# Patient Record
Sex: FEMALE
[De-identification: names, ages, dates, MRNs, and addresses within clinical notes are randomized per-mention and may not be internally consistent; named-entity substitution may affect disease eponyms.]

---

## 2022-02-03 ENCOUNTER — NON-APPOINTMENT (OUTPATIENT)
Age: 57
End: 2022-02-03

## 2022-02-03 PROBLEM — Z00.00 ENCOUNTER FOR PREVENTIVE HEALTH EXAMINATION: Status: ACTIVE | Noted: 2022-02-03

## 2022-02-07 ENCOUNTER — APPOINTMENT (OUTPATIENT)
Dept: OBGYN | Facility: CLINIC | Age: 57
End: 2022-02-07
Payer: COMMERCIAL

## 2022-02-07 ENCOUNTER — NON-APPOINTMENT (OUTPATIENT)
Age: 57
End: 2022-02-07

## 2022-02-07 VITALS
SYSTOLIC BLOOD PRESSURE: 121 MMHG | DIASTOLIC BLOOD PRESSURE: 73 MMHG | WEIGHT: 165 LBS | HEART RATE: 68 BPM | TEMPERATURE: 97.7 F | HEIGHT: 69.5 IN | BODY MASS INDEX: 23.89 KG/M2

## 2022-02-07 DIAGNOSIS — R39.89 OTHER SYMPTOMS AND SIGNS INVOLVING THE GENITOURINARY SYSTEM: ICD-10-CM

## 2022-02-07 DIAGNOSIS — N76.0 ACUTE VAGINITIS: ICD-10-CM

## 2022-02-07 LAB
BILIRUB UR QL STRIP: NORMAL
GLUCOSE UR-MCNC: NORMAL
HCG UR QL: 0.2 EU/DL
HGB UR QL STRIP.AUTO: NORMAL
KETONES UR-MCNC: NORMAL
LEUKOCYTE ESTERASE UR QL STRIP: NORMAL
NITRITE UR QL STRIP: NORMAL
PH UR STRIP: 6.5
PROT UR STRIP-MCNC: NORMAL
SP GR UR STRIP: 1.02

## 2022-02-07 PROCEDURE — 81003 URINALYSIS AUTO W/O SCOPE: CPT | Mod: QW

## 2022-02-07 PROCEDURE — 99213 OFFICE O/P EST LOW 20 MIN: CPT

## 2022-02-07 RX ORDER — CIPROFLOXACIN HYDROCHLORIDE 500 MG/1
500 TABLET, FILM COATED ORAL
Qty: 14 | Refills: 1 | Status: ACTIVE | COMMUNITY
Start: 2022-02-07 | End: 1900-01-01

## 2022-02-07 RX ORDER — FLUCONAZOLE 200 MG/1
200 TABLET ORAL
Qty: 3 | Refills: 3 | Status: ACTIVE | COMMUNITY
Start: 2022-02-07 | End: 1900-01-01

## 2022-02-07 RX ORDER — CLOTRIMAZOLE AND BETAMETHASONE DIPROPIONATE 10; .5 MG/G; MG/G
1-0.05 CREAM TOPICAL TWICE DAILY
Qty: 1 | Refills: 3 | Status: ACTIVE | COMMUNITY
Start: 2022-02-07 | End: 1900-01-01

## 2022-02-07 NOTE — HISTORY OF PRESENT ILLNESS
[FreeTextEntry1] : HERE DUE TO SYMPTOMS OF UTI\par \par \par    	Print\par Patient\par Name	NAVNEET CARBALLO (56yo, F) ID# [id number]	Appt. Date/Time	2020 12:00PM\par 	1965	Service Dept.	[facility] BK OFFICE\par Provider	TORIE SANTILLAN MD\par Insurance	\par Med Primary: MEDICARE-[facility] (MEDICARE)\par Insurance # : [id number]\par Employer Name : [employer]\par Med Secondary: [employer]\par Insurance # : [id number]\par Employer Name : [employer]\par Prescription: [employer] - Member is eligible. details\par Chief Complaint\par annual gyn exam, c/o lower back pain\par \par HEMATURIA MICROSCOPIC\par Patient's Care Team\par Primary Care Provider: RONNA SEAY MD: [address] [place] [address], Ph [phone], Fax [phone] NPI: [id number]\par Patient's Pharmacies\par "[id number] ([place]): [address] [facility] [address], Ph [phone], Fax [phone]\par Vitals\par 2020 12:20 pm\par Ht:	5 ft 10 in\par Wt:	166 lbs\par BMI:	23.8\par BP:	122/66 sitting\par T:	97.1 F°\par Allergies\par Reviewed Allergies\par CORTISONE	\par LATEX	\par PENICILLINASE	\par PENICILLINS	\par SULFA (SULFONAMIDE ANTIBIOTICS)	\par Medications\par Reviewed Medications\par Aimovig Autoinjector 70 mg/mL subcutaneous auto-injector\par 18   filled	PRESCRIPTION SOLUTIONS\par Ajovy Syringe 225 mg/1.5 mL subcutaneous\par inject 1 SYRINGE ( 225 milligrams ) subcutaneously Every Month\par 19   filled	surescripts\par albuterol sulfate 2.5 mg/3 mL (0.083 %) solution for nebulization\par 17   filled	PRESCRIPTION SOLUTIONS\par Aptiom 200 mg tablet\par 17   filled	PRESCRIPTION SOLUTIONS\par atorvastatin 20 mg tablet\par 20   filled	surescripts\par benzonatate 100 mg capsule\par 16   filled	PRESCRIPTION SOLUTIONS\par betamethasone dipropionate 0.05 % topical ointment\par 19   filled	surescripts\par buPROPion HCL  mg 24 hr tablet, extended release\par 17   filled	PRESCRIPTION SOLUTIONS\par citalopram 20 mg tablet\par 17   filled	PRESCRIPTION SOLUTIONS\par clotrimazole-betamethasone 1 %-0.05 % topical cream\par APPLY TO THE AFFECTED AND SURROUNDING AREAS OF SKIN BY TOPICAL ROUTE 2 TIMES PER DAY IN THE MORNING AND EVENING FOR 2 WEEKS\par 18   prescribed	Torie Santillan MD\par Contour Control Solution, Normal\par 16   filled	PRESCRIPTION SOLUTIONS\par Contour Test Strips\par 19   filled	PRESCRIPTION SOLUTIONS\par dihydroergotamine 0.5 mg/pump act. (4 mg/mL) nasal spray\par 18   filled	PRESCRIPTION SOLUTIONS\par erythromycin-benzoyl peroxide 3 %-5 % topical gel\par 18   filled	PRESCRIPTION SOLUTIONS\par escitalopram 10 mg tablet\par 19   filled	PRESCRIPTION SOLUTIONS\par escitalopram 20 mg tablet\par take 1 tablet by mouth every evening\par 19   filled	surescripts\par esomeprazole magnesium 40 mg capsule,delayed release\par 18   filled	PRESCRIPTION SOLUTIONS\par fluticasone propionate 0.05 % topical cream\par 17   filled	PRESCRIPTION SOLUTIONS\par gabapentin 100 mg capsule\par 18   filled	PRESCRIPTION SOLUTIONS\par gabapentin 600 mg tablet\par 02/15/19   filled	PRESCRIPTION SOLUTIONS\par Latuda 20 mg tablet\par 18   filled	PRESCRIPTION SOLUTIONS\par lidocaine 5 % topical ointment\par 18   filled	PRESCRIPTION SOLUTIONS\par LORazepam 0.5 mg tablet\par 09/09/15   filled	PRESCRIPTION SOLUTIONS\par Lorzone 750 mg tablet\par TAKE 1 T PO QD HS\par 10/18/19   filled	surescripts\par methylPREDNISolone 4 mg tablets in a dose pack\par 16   filled	PRESCRIPTION SOLUTIONS\par metoclopramide 10 mg tablet\par 10/28/16   filled	PRESCRIPTION SOLUTIONS\par metroNIDAZOLE 500 mg tablet\par Take 1 tablet(s) every 12 hours by oral route for 7 days.\par 18   prescribed	Torie Santillan MD\par Microlet Lancet\par 19   filled	PRESCRIPTION SOLUTIONS\par mometasone 50 mcg/actuation nasal spray\par 17   filled	PRESCRIPTION SOLUTIONS\par montelukast 10 mg tablet\par 08/19/15   filled	PRESCRIPTION SOLUTIONS\par MorphaBond ER 15 mg tablet,oral ONLY (not feeding tubes)\par 18   filled	PRESCRIPTION SOLUTIONS\par mupirocin calcium 2 % topical cream\par 19   filled	PRESCRIPTION SOLUTIONS\par nitrofurantoin monohydrate/macrocrystals 100 mg capsule\par 16   filled	PRESCRIPTION SOLUTIONS\par nortriptyline 25 mg capsule\par 12/12/15   filled	PRESCRIPTION SOLUTIONS\par Nucynta ER 50 mg tablet,extended release\par 17   filled	PRESCRIPTION SOLUTIONS\par nystatin-triamcinolone 100,000 unit/g-0.1 % topical cream\par APPLY TO THE AFFECTED AREA(S) BY TOPICAL ROUTE 2 TIMES PER DAY IN THE MORNING AND EVENING\par 19   prescribed	Kacie Willard MD\par olopatadine 0.2 % eye drops\par 05/10/18   filled	PRESCRIPTION SOLUTIONS\par ondansetron 4 mg disintegrating tablet\par 06/10/19   filled	PRESCRIPTION SOLUTIONS\par Oxtellar  mg tablet,extended release\par 18   filled	PRESCRIPTION SOLUTIONS\par oxybutynin chloride ER 10 mg tablet,extended release 24 hr\par 20   filled	surescripts\par Pazeo 0.7 % eye drops\par 10/12/16   filled	PRESCRIPTION SOLUTIONS\par prednisoLONE sodium phosphate 20 mg/5 mL (4 mg/mL) oral solution\par 19   filled	PRESCRIPTION SOLUTIONS\par predniSONE 20 mg tablet\par 17   filled	PRESCRIPTION SOLUTIONS\par Prepopik 10 mg-3.5 gram-12 gram oral powder packet\par 17   filled	PRESCRIPTION SOLUTIONS\par ProAir HFA 90 mcg/actuation aerosol inhaler\par inhale 2 puffs by mouth and INTO THE LUNGS every 4 hours if needed\par 19   filled	surescripts\par raNITIdine 150 mg capsule\par 19   filled	PRESCRIPTION SOLUTIONS\par Rexulti 0.25 mg tablet\par 19   filled	PRESCRIPTION SOLUTIONS\par Rexulti 0.5 mg tablet\par TK 1 T PO Q EVENING\par 20   filled	surescripts\par rosuvastatin 20 mg tablet\par 16   filled	PRESCRIPTION SOLUTIONS\par sertraline 50 mg tablet\par 11/25/15   filled	PRESCRIPTION SOLUTIONS\par Silenor 6 mg tablet\par 18   filled	PRESCRIPTION SOLUTIONS\par SUMAtriptan 100 mg tablet\par TK 1 T PO QD UTD\par 19   filled	surescripts\par tiZANidine 4 mg tablet\par 16   filled	PRESCRIPTION SOLUTIONS\par tobramycin 0.3 % eye drops\par 19   filled	PRESCRIPTION SOLUTIONS\par traZODone 50 mg tablet\par 16   filled	PRESCRIPTION SOLUTIONS\par Trintellix 10 mg tablet\par 17   filled	PRESCRIPTION SOLUTIONS\par Trintellix 20 mg tablet\par 17   filled	PRESCRIPTION SOLUTIONS\par Trokendi  mg capsule, extended release\par 17   filled	PRESCRIPTION SOLUTIONS\par Trokendi  mg capsule, extended release\par TK 1 C PO Q NIGHT\par 19   filled	surescripts\par Trokendi XR 25 mg capsule,extended release\par 18   filled	PRESCRIPTION SOLUTIONS\par Zirgan 0.15 % eye gel\par 19   filled	PRESCRIPTION SOLUTIONS\par Zomig 5 mg nasal spray\par 10/29/15   filled	PRESCRIPTION SOLUTIONS\par Problems\par Reviewed Problems\par Amenorrhea\par Abnormal cervical Papanicolaou smear\par Gynecologic examination\par Urinary incontinence\par Pain in pelvis, Right\par Family History\par Reviewed Family History\par Mother	- Hypertensive disorder\par Father	- Pulmonary emphysema ( age: 81)\par  	- Diabetes mellitus\par Unspecified Relation	- Malignant tumor of ovary\par - MAT NIECE\par Maternal Aunt	- Malignant tumor of breast\par - x2\par Social History\par Reviewed Social History\par Tobacco Smoking Status: Never smoker\par Most Recent Tobacco Use Screenin2020\par DISABLED \par Surgical History\par Reviewed Surgical History\par Oophorectomy - RSO....TORSION AGE 21\par GYN History\par Reviewed GYN History\par LMP: Approximate.\par Date of LMP: (Notes: ).\par Obstetric History\par Reviewed Obstetric History\par TOTAL	FULL	PRE	AB. I	AB. S	ECTOPICS	MULTIPLE	LIVING\par 1	1						1\par 1 ....8 YO..JACQUI IVF\par Past Medical History\par Reviewed Past Medical History\par GI Problems: Y - REFLUX\par Headaches or Migraines: Y\par Documents for Discussion\par Discussed the following documents:\par URINALYSIS, DIPSTICK - 20\par Results:\par - Blood: Moderate   \par - Nitrite: negative   \par - Protein: Negative   \par - Bilirubin: Negative   \par - Glucose: Negative   \par - Color: Pale Yellow   \par Screening\par None recorded.\par HPI\par routine gyn exam\par ROS\par Patient reports no fatigue, no fever, no significant weight gain, and no significant weight loss. She reports no abnormal moles and no rashes. She reports no irritation and no vision changes. She reports no hearing loss, no ear pain, no nose/sinus problems, no sore throat, no snoring, no dry mouth, and no mouth ulcers. She reports no dyspnea / shortness of breath, no cough, no sputum production, no hemoptysis, and no wheezing. She reports no chest pain, no palpitations, and no orthopnea. She reports no heartburn, no dysphagia, no nausea, no vomiting, no abdominal pain, no bowel movement changes, no diarrhea, no constipation, and no rectal bleeding. She reports no hematuria, no abnormal bleeding, no flank pain, no trouble urinating, no incontinence, no rash, no lesion, no discharge, no vaginal odor, and no vaginal itching. She reports no menstrual problems and no PMDD symptoms. She reports no menopausal symptoms. She reports no sexual problems. She reports no muscle aches, no muscle weakness, no arthralgias/joint pain, and no back pain. She reports no headaches, no dizziness, no LOC, no weakness, no numbness, and no seizures. She reports no depression, no alcoholism, and no sleep disturbances.\par Physical Exam\par Patient is a 55-year-old female.\par \par Chaperone: Chaperone: present.\par \par Female Genitalia: Vulva: no masses, atrophy, or lesions. Bladder/Urethra: no urethral discharge or mass and normal meatus and bladder non distended. Vagina no tenderness, erythema, cystocele, rectocele, abnormal vaginal discharge, or vesicle(s) or ulcers. Cervix: no discharge or cervical motion tenderness and grossly normal. Uterus: normal size and shape and midline, mobile, non-tender, and no uterine prolapse. Adnexa/Parametria: no parametrial tenderness or mass and no adnexal tenderness or ovarian mass.\par \par Breast: Inspection/Palpation: no erythema, induration, tenderness, skin changes, abnormal secretions, or distinct masses and normal nipple appearance and non tender axillary lymph nodes.\par \par Abdomen: Auscultation/Inspection/Palpation: no hepatomegaly, splenomegaly, or masses and soft, non-distended, normal bowel sounds, and CVA tenderness (? RIGHT). Hernia: none palpated.\par Assessment / Plan\par 1. Gynecologic examination -\par MAMMO/SONO\par \par Z01.411: Encounter for gynecological examination (general) (routine) with abnormal findings\par PAP, LB\par \par 2. Pain in pelvis -\par NOT GYN, RIGHT\par \par KIDNEY STONES?\par \par ORDER RIGHT KIDNEY SONO\par \par R10.2: Pelvic and perineal pain\par US, TRANSVAGINAL\par \par 3. Microscopic hematuria -\par FISH\par \par FU UROLOGY\par \par R31.21: Asymptomatic microscopic hematuria\par URINALYSIS, DIPSTICK\par \par URINALYSIS, DIPSTICK\par Results:\par - Blood: Moderate\par - Nitrite: negative\par - Protein: Negative\par - Glucose: Negative\par - Color: Pale Yellow\par - Bilirubin: Negative\par \par US, TRANSVAGINAL\par \par Review of us, transvaginal taken on 2020 at [facility] BK OFFICE shows:\par Imaging Studies:\par Indications: pelvic pain.\par Uterus: volume (cc): 23.\par Endometrium: thickness 2.8 mm.\par Cervix: normal.\par Cul de sac: no fluid was demonstrated.\par Right Ovary: not-visualized, size (cm): ___.\par Left Ovary: volume (cc): 0.5.\par \par Return to Office\par None recorded.\par Encounter Sign-Off\par Encounter signed-off by Torie Santillan MD, 2020.\par Encounter performed and documented by Torie Santillan MD\par Encounter reviewed & signed by Torie Santillan MD on 2020 at 12:47pm\par Audit history\par \par \par Image Documentation #[id number]\par

## 2022-02-09 ENCOUNTER — NON-APPOINTMENT (OUTPATIENT)
Age: 57
End: 2022-02-09

## 2022-02-13 LAB
A VAGINAE DNA VAG QL NAA+PROBE: NORMAL
BACTERIA UR CULT: NORMAL
BVAB2 DNA VAG QL NAA+PROBE: NORMAL
C KRUSEI DNA VAG QL NAA+PROBE: NEGATIVE
C TRACH RRNA SPEC QL NAA+PROBE: NEGATIVE
MEGA1 DNA VAG QL NAA+PROBE: NORMAL
N GONORRHOEA RRNA SPEC QL NAA+PROBE: NEGATIVE
T VAGINALIS RRNA SPEC QL NAA+PROBE: NEGATIVE

## 2022-02-14 ENCOUNTER — NON-APPOINTMENT (OUTPATIENT)
Age: 57
End: 2022-02-14

## 2022-03-14 ENCOUNTER — APPOINTMENT (OUTPATIENT)
Dept: OBGYN | Facility: CLINIC | Age: 57
End: 2022-03-14
Payer: COMMERCIAL

## 2022-03-14 VITALS
HEIGHT: 69.5 IN | BODY MASS INDEX: 24.04 KG/M2 | DIASTOLIC BLOOD PRESSURE: 72 MMHG | TEMPERATURE: 97.5 F | HEART RATE: 64 BPM | SYSTOLIC BLOOD PRESSURE: 124 MMHG | WEIGHT: 166 LBS

## 2022-03-14 DIAGNOSIS — Z01.419 ENCOUNTER FOR GYNECOLOGICAL EXAMINATION (GENERAL) (ROUTINE) W/OUT ABNORMAL FINDINGS: ICD-10-CM

## 2022-03-14 PROCEDURE — 99396 PREV VISIT EST AGE 40-64: CPT

## 2022-03-14 NOTE — HISTORY OF PRESENT ILLNESS
[FreeTextEntry1] : here for pap and annual\par still with rlq pain\par did not have uti\par seeing urogyn after ct scan tomorrow

## 2022-03-17 LAB — HPV HIGH+LOW RISK DNA PNL CVX: NOT DETECTED

## 2022-03-27 LAB — CYTOLOGY CVX/VAG DOC THIN PREP: NORMAL

## 2022-05-09 ENCOUNTER — NON-APPOINTMENT (OUTPATIENT)
Age: 57
End: 2022-05-09

## 2022-05-16 ENCOUNTER — NON-APPOINTMENT (OUTPATIENT)
Age: 57
End: 2022-05-16

## 2022-05-19 ENCOUNTER — NON-APPOINTMENT (OUTPATIENT)
Age: 57
End: 2022-05-19

## 2022-05-19 DIAGNOSIS — N60.09 SOLITARY CYST OF UNSPECIFIED BREAST: ICD-10-CM

## 2022-05-23 ENCOUNTER — NON-APPOINTMENT (OUTPATIENT)
Age: 57
End: 2022-05-23

## 2022-07-13 ENCOUNTER — NON-APPOINTMENT (OUTPATIENT)
Age: 57
End: 2022-07-13

## 2022-08-01 ENCOUNTER — NON-APPOINTMENT (OUTPATIENT)
Age: 57
End: 2022-08-01

## 2022-08-01 ENCOUNTER — APPOINTMENT (OUTPATIENT)
Dept: OBGYN | Facility: CLINIC | Age: 57
End: 2022-08-01

## 2022-08-01 VITALS
WEIGHT: 167 LBS | DIASTOLIC BLOOD PRESSURE: 71 MMHG | BODY MASS INDEX: 24.18 KG/M2 | HEART RATE: 71 BPM | HEIGHT: 69.5 IN | SYSTOLIC BLOOD PRESSURE: 118 MMHG

## 2022-08-01 DIAGNOSIS — N89.8 OTHER SPECIFIED NONINFLAMMATORY DISORDERS OF VAGINA: ICD-10-CM

## 2022-08-01 LAB
BILIRUB UR QL STRIP: NEGATIVE
CLARITY UR: CLEAR
COLLECTION METHOD: NORMAL
GLUCOSE UR-MCNC: NEGATIVE
HCG UR QL: 0.2 EU/DL
HGB UR QL STRIP.AUTO: NORMAL
KETONES UR-MCNC: NEGATIVE
LEUKOCYTE ESTERASE UR QL STRIP: NEGATIVE
NITRITE UR QL STRIP: NEGATIVE
PH UR STRIP: 5
PROT UR STRIP-MCNC: NEGATIVE
SP GR UR STRIP: 1.03

## 2022-08-01 PROCEDURE — 99213 OFFICE O/P EST LOW 20 MIN: CPT

## 2022-08-01 PROCEDURE — 81003 URINALYSIS AUTO W/O SCOPE: CPT | Mod: QW

## 2022-08-01 NOTE — HISTORY OF PRESENT ILLNESS
[FreeTextEntry1] : -pt complains of persistent right lower quadrant pain.\par -she states that GI and  consults were negative\par -recent pelvic and renal us were negative\par \par -she seeks my advice today regarding this pain and vaginal discharge

## 2022-08-02 ENCOUNTER — NON-APPOINTMENT (OUTPATIENT)
Age: 57
End: 2022-08-02

## 2022-09-06 ENCOUNTER — NON-APPOINTMENT (OUTPATIENT)
Age: 57
End: 2022-09-06

## 2022-09-07 ENCOUNTER — NON-APPOINTMENT (OUTPATIENT)
Age: 57
End: 2022-09-07

## 2023-06-01 ENCOUNTER — NON-APPOINTMENT (OUTPATIENT)
Age: 58
End: 2023-06-01

## 2023-10-05 ENCOUNTER — NON-APPOINTMENT (OUTPATIENT)
Age: 58
End: 2023-10-05

## 2023-10-05 DIAGNOSIS — B96.89 ACUTE VAGINITIS: ICD-10-CM

## 2023-10-05 DIAGNOSIS — N76.0 ACUTE VAGINITIS: ICD-10-CM

## 2023-10-07 RX ORDER — FLUCONAZOLE 200 MG/1
200 TABLET ORAL
Qty: 2 | Refills: 0 | Status: ACTIVE | COMMUNITY
Start: 2023-10-05 | End: 1900-01-01

## 2023-10-07 RX ORDER — METRONIDAZOLE 500 MG/1
500 TABLET ORAL TWICE DAILY
Qty: 14 | Refills: 0 | Status: ACTIVE | COMMUNITY
Start: 2023-10-05 | End: 1900-01-01

## 2024-03-25 ENCOUNTER — APPOINTMENT (OUTPATIENT)
Dept: OBGYN | Facility: CLINIC | Age: 59
End: 2024-03-25
Payer: MEDICARE

## 2024-03-25 VITALS
HEART RATE: 61 BPM | SYSTOLIC BLOOD PRESSURE: 132 MMHG | WEIGHT: 162 LBS | DIASTOLIC BLOOD PRESSURE: 81 MMHG | BODY MASS INDEX: 23.45 KG/M2 | HEIGHT: 69.5 IN

## 2024-03-25 DIAGNOSIS — R31.29 OTHER MICROSCOPIC HEMATURIA: ICD-10-CM

## 2024-03-25 DIAGNOSIS — Z78.9 OTHER SPECIFIED HEALTH STATUS: ICD-10-CM

## 2024-03-25 DIAGNOSIS — N93.9 ABNORMAL UTERINE AND VAGINAL BLEEDING, UNSPECIFIED: ICD-10-CM

## 2024-03-25 DIAGNOSIS — R10.2 PELVIC AND PERINEAL PAIN: ICD-10-CM

## 2024-03-25 LAB
BILIRUB UR QL STRIP: NORMAL
CLARITY UR: CLEAR
COLLECTION METHOD: NORMAL
GLUCOSE UR-MCNC: NORMAL
HCG UR QL: 0.2 EU/DL
HGB UR QL STRIP.AUTO: NORMAL
KETONES UR-MCNC: NORMAL
LEUKOCYTE ESTERASE UR QL STRIP: NORMAL
NITRITE UR QL STRIP: NORMAL
PH UR STRIP: 6
PROT UR STRIP-MCNC: NORMAL
SP GR UR STRIP: 1.02

## 2024-03-25 PROCEDURE — 99213 OFFICE O/P EST LOW 20 MIN: CPT | Mod: 25

## 2024-03-25 PROCEDURE — 76830 TRANSVAGINAL US NON-OB: CPT

## 2024-03-25 PROCEDURE — 81003 URINALYSIS AUTO W/O SCOPE: CPT | Mod: QW

## 2024-03-25 NOTE — PHYSICAL EXAM
[Chaperone Present] : A chaperone was present in the examining room during all aspects of the physical examination [Vulvar Atrophy] : vulvar atrophy [Labia Majora] : normal [Labia Minora] : normal [Normal] : normal [Uterine Adnexae] : normal [FreeTextEntry1] : NO BLOOD [FreeTextEntry4] : NO BLOOD

## 2024-03-25 NOTE — PROCEDURE
[Transvaginal Ultrasound] : transvaginal ultrasound [FreeTextEntry3] : RIGHT OV NORMAL CERVIX NORMAL NO FREE FLUID [FreeTextEntry5] : 27 CC VOL,1.3 MM [FreeTextEntry7] : 0.9 CC [FreeTextEntry8] : ABSENT

## 2024-03-30 LAB — HPV HIGH+LOW RISK DNA PNL CVX: NOT DETECTED

## 2024-04-03 LAB — CYTOLOGY CVX/VAG DOC THIN PREP: ABNORMAL

## 2024-04-15 ENCOUNTER — APPOINTMENT (OUTPATIENT)
Dept: OBGYN | Facility: CLINIC | Age: 59
End: 2024-04-15

## 2024-09-30 ENCOUNTER — APPOINTMENT (OUTPATIENT)
Dept: OBGYN | Facility: CLINIC | Age: 59
End: 2024-09-30

## 2024-10-07 ENCOUNTER — APPOINTMENT (OUTPATIENT)
Dept: OBGYN | Facility: CLINIC | Age: 59
End: 2024-10-07
Payer: MEDICARE

## 2024-10-07 ENCOUNTER — NON-APPOINTMENT (OUTPATIENT)
Age: 59
End: 2024-10-07

## 2024-10-07 VITALS
WEIGHT: 158 LBS | HEIGHT: 69.5 IN | DIASTOLIC BLOOD PRESSURE: 75 MMHG | SYSTOLIC BLOOD PRESSURE: 125 MMHG | HEART RATE: 69 BPM | BODY MASS INDEX: 22.88 KG/M2

## 2024-10-07 DIAGNOSIS — Z80.41 FAMILY HISTORY OF MALIGNANT NEOPLASM OF OVARY: ICD-10-CM

## 2024-10-07 DIAGNOSIS — K42.9 UMBILICAL HERNIA W/OUT OBSTRUCTION OR GANGRENE: ICD-10-CM

## 2024-10-07 DIAGNOSIS — N93.9 ABNORMAL UTERINE AND VAGINAL BLEEDING, UNSPECIFIED: ICD-10-CM

## 2024-10-07 DIAGNOSIS — N94.10 UNSPECIFIED DYSPAREUNIA: ICD-10-CM

## 2024-10-07 DIAGNOSIS — Z01.419 ENCOUNTER FOR GYNECOLOGICAL EXAMINATION (GENERAL) (ROUTINE) W/OUT ABNORMAL FINDINGS: ICD-10-CM

## 2024-10-07 PROCEDURE — 76830 TRANSVAGINAL US NON-OB: CPT

## 2024-10-07 PROCEDURE — G0101: CPT

## 2024-10-07 PROCEDURE — 99215 OFFICE O/P EST HI 40 MIN: CPT | Mod: 25

## 2024-10-07 RX ORDER — ESTRADIOL 0.1 MG/G
0.1 CREAM VAGINAL
Qty: 1 | Refills: 3 | Status: ACTIVE | COMMUNITY
Start: 2024-10-07 | End: 1900-01-01

## 2024-10-07 RX ORDER — SUMATRIPTAN SUCCINATE 100 MG/1
TABLET, FILM COATED ORAL
Refills: 0 | Status: ACTIVE | COMMUNITY

## 2024-10-11 LAB — HPV HIGH+LOW RISK DNA PNL CVX: NOT DETECTED

## 2024-10-19 LAB — CYTOLOGY CVX/VAG DOC THIN PREP: ABNORMAL

## 2024-10-22 ENCOUNTER — NON-APPOINTMENT (OUTPATIENT)
Age: 59
End: 2024-10-22

## 2025-01-27 ENCOUNTER — NON-APPOINTMENT (OUTPATIENT)
Age: 60
End: 2025-01-27

## 2025-01-28 ENCOUNTER — APPOINTMENT (OUTPATIENT)
Dept: OBGYN | Facility: CLINIC | Age: 60
End: 2025-01-28
Payer: MEDICARE

## 2025-01-28 ENCOUNTER — NON-APPOINTMENT (OUTPATIENT)
Age: 60
End: 2025-01-28

## 2025-01-28 VITALS
HEIGHT: 69.5 IN | BODY MASS INDEX: 23.45 KG/M2 | SYSTOLIC BLOOD PRESSURE: 144 MMHG | DIASTOLIC BLOOD PRESSURE: 84 MMHG | HEART RATE: 89 BPM | WEIGHT: 162 LBS

## 2025-01-28 DIAGNOSIS — N89.8 OTHER SPECIFIED NONINFLAMMATORY DISORDERS OF VAGINA: ICD-10-CM

## 2025-01-28 DIAGNOSIS — R39.89 OTHER SYMPTOMS AND SIGNS INVOLVING THE GENITOURINARY SYSTEM: ICD-10-CM

## 2025-01-28 DIAGNOSIS — R10.2 PELVIC AND PERINEAL PAIN: ICD-10-CM

## 2025-01-28 DIAGNOSIS — R31.29 OTHER MICROSCOPIC HEMATURIA: ICD-10-CM

## 2025-01-28 DIAGNOSIS — K42.9 UMBILICAL HERNIA W/OUT OBSTRUCTION OR GANGRENE: ICD-10-CM

## 2025-01-28 DIAGNOSIS — R87.615 UNSATISFACTORY CYTOLOGIC SMEAR OF CERVIX: ICD-10-CM

## 2025-01-28 DIAGNOSIS — R97.8 OTHER ABNORMAL TUMOR MARKERS: ICD-10-CM

## 2025-01-28 LAB
BILIRUB UR QL STRIP: NORMAL
CLARITY UR: CLEAR
COLLECTION METHOD: NORMAL
GLUCOSE UR-MCNC: NORMAL
HCG UR QL: 0.2 EU/DL
HGB UR QL STRIP.AUTO: ABNORMAL
KETONES UR-MCNC: ABNORMAL
LEUKOCYTE ESTERASE UR QL STRIP: NORMAL
NITRITE UR QL STRIP: NORMAL
PH UR STRIP: 6
PROT UR STRIP-MCNC: NORMAL
SP GR UR STRIP: 1.01

## 2025-01-28 PROCEDURE — 99215 OFFICE O/P EST HI 40 MIN: CPT | Mod: 25

## 2025-01-28 PROCEDURE — 81003 URINALYSIS AUTO W/O SCOPE: CPT | Mod: QW

## 2025-01-28 PROCEDURE — 76830 TRANSVAGINAL US NON-OB: CPT

## 2025-01-29 ENCOUNTER — NON-APPOINTMENT (OUTPATIENT)
Age: 60
End: 2025-01-29

## 2025-01-31 ENCOUNTER — NON-APPOINTMENT (OUTPATIENT)
Age: 60
End: 2025-01-31

## 2025-02-01 LAB
A VAGINAE DNA VAG QL NAA+PROBE: NORMAL
BACTERIA UR CULT: NORMAL
BVAB2 DNA VAG QL NAA+PROBE: NORMAL
C KRUSEI DNA VAG QL NAA+PROBE: NEGATIVE
C TRACH RRNA SPEC QL NAA+PROBE: NEGATIVE
CANDIDA DNA VAG QL NAA+PROBE: NEGATIVE
HPV HIGH+LOW RISK DNA PNL CVX: NOT DETECTED
MEGA1 DNA VAG QL NAA+PROBE: NORMAL
N GONORRHOEA RRNA SPEC QL NAA+PROBE: NEGATIVE
T VAGINALIS RRNA SPEC QL NAA+PROBE: NEGATIVE

## 2025-02-05 ENCOUNTER — NON-APPOINTMENT (OUTPATIENT)
Age: 60
End: 2025-02-05

## 2025-02-08 LAB — CYTOLOGY CVX/VAG DOC THIN PREP: NORMAL

## 2025-02-13 ENCOUNTER — APPOINTMENT (OUTPATIENT)
Dept: SURGERY | Facility: CLINIC | Age: 60
End: 2025-02-13

## 2025-02-18 ENCOUNTER — APPOINTMENT (OUTPATIENT)
Dept: HEMATOLOGY ONCOLOGY | Facility: CLINIC | Age: 60
End: 2025-02-18
Payer: MEDICARE

## 2025-02-18 ENCOUNTER — LABORATORY RESULT (OUTPATIENT)
Age: 60
End: 2025-02-18

## 2025-02-18 ENCOUNTER — RESULT REVIEW (OUTPATIENT)
Age: 60
End: 2025-02-18

## 2025-02-18 VITALS
HEIGHT: 69.5 IN | RESPIRATION RATE: 18 BRPM | TEMPERATURE: 97.6 F | OXYGEN SATURATION: 98 % | HEART RATE: 75 BPM | BODY MASS INDEX: 22.62 KG/M2 | SYSTOLIC BLOOD PRESSURE: 115 MMHG | WEIGHT: 156.25 LBS | DIASTOLIC BLOOD PRESSURE: 77 MMHG

## 2025-02-18 DIAGNOSIS — R97.8 OTHER ABNORMAL TUMOR MARKERS: ICD-10-CM

## 2025-02-18 PROCEDURE — G2211 COMPLEX E/M VISIT ADD ON: CPT

## 2025-02-18 PROCEDURE — 99204 OFFICE O/P NEW MOD 45 MIN: CPT

## 2025-02-20 LAB
CANCER AG125 SERPL-ACNC: 15 U/ML
CANCER AG27-29 SERPL-ACNC: 28.5 U/ML

## 2025-02-21 ENCOUNTER — APPOINTMENT (OUTPATIENT)
Dept: CT IMAGING | Facility: HOSPITAL | Age: 60
End: 2025-02-21
Payer: MEDICARE

## 2025-02-21 ENCOUNTER — OUTPATIENT (OUTPATIENT)
Dept: OUTPATIENT SERVICES | Facility: HOSPITAL | Age: 60
LOS: 1 days | End: 2025-02-21
Payer: MEDICARE

## 2025-02-21 PROCEDURE — 74177 CT ABD & PELVIS W/CONTRAST: CPT | Mod: 26

## 2025-02-21 PROCEDURE — 71260 CT THORAX DX C+: CPT

## 2025-02-21 PROCEDURE — 71260 CT THORAX DX C+: CPT | Mod: 26

## 2025-02-21 PROCEDURE — 74177 CT ABD & PELVIS W/CONTRAST: CPT

## 2025-02-21 PROCEDURE — 82565 ASSAY OF CREATININE: CPT

## 2025-03-03 ENCOUNTER — APPOINTMENT (OUTPATIENT)
Dept: HEMATOLOGY ONCOLOGY | Facility: CLINIC | Age: 60
End: 2025-03-03
Payer: MEDICARE

## 2025-03-03 DIAGNOSIS — R97.8 OTHER ABNORMAL TUMOR MARKERS: ICD-10-CM

## 2025-03-03 PROCEDURE — 99214 OFFICE O/P EST MOD 30 MIN: CPT | Mod: 2W

## 2025-03-26 ENCOUNTER — TRANSCRIPTION ENCOUNTER (OUTPATIENT)
Age: 60
End: 2025-03-26

## 2025-04-07 ENCOUNTER — APPOINTMENT (OUTPATIENT)
Dept: OBGYN | Facility: CLINIC | Age: 60
End: 2025-04-07